# Patient Record
(demographics unavailable — no encounter records)

---

## 2024-10-10 NOTE — HISTORY OF PRESENT ILLNESS
[FreeTextEntry1] : Patient was recently diagnosed with hypothyroidism. On Synthroid 112/day  which is an increase.  She had twin girls.and are doing well. There was no gestational DM. She is having difficulty losing weight. She is now on OCP so menses are light. Symptoms of palpitations resolved.  She has not been here for over a year and does admit to skipping doses of her thyroid medicine. Lost weight on Trulicity but stopped because felt weak and cold; also stopped metformin.  She says her menses are regular.

## 2024-10-10 NOTE — ASSESSMENT
[Carbohydrate Consistent Diet] : carbohydrate consistent diet [Importance of Diet and Exercise] : importance of diet and exercise to improve glycemic control, achieve weight loss and improve cardiovascular health [Levothyroxine] : The patient was instructed to take Levothyroxine on an empty stomach, separate from vitamins, and wait at least 30 minutes before eating [FreeTextEntry1] : Patient with hypothyroidism,  on Synthroid 112 which is an increase.  Hesitant to draw blood today since she is admits to skipping doses of her medicine will be compliant and check labs in 4 weeks.. Thyroid ultrasound showed some new nodules and increase in old nodules in a diffusely heterogenous gland.  Will repeat once thyroid levels are stable.  Has appointment for labs in November and will follow-up with me in 3 months.  Synthroid 112 was renewed.

## 2024-11-01 NOTE — HISTORY OF PRESENT ILLNESS
[Improved Health] : Improved health [Improved Mobility] : Improved mobility [Young Adult] : yound adult [Exercise: ____] : exercise: [unfilled] [Prescription Medications: _____] : prescription medications: [unfilled] [Poor eating habits] : poor eating habits [8] : 8 [I usually sleep 6-8 hours] : I usually sleep 6-8 hours [2 blocks] : Walking distance capability:2 blocks [0] : 0 [None] : none [FreeTextEntry2] : 190 [FreeTextEntry3] : 250 [] : No [FreeTextEntry1] : 27 year old female with hypothyroidism, IR, ONEIL and anxiety presents for evaluation of weight gain. Reports weight gain over the last 1-2 years. She was able to lose weight after having her twins almost 5 years ago but has regained the weight since. She admits that her diet is not the best but she tries to have small portions. She does not exercise d/t lack of time. She feels fatigued.  She has a h/o not being compliant with taking her thyroid medication. She states she just forgets. Most recently has made an effort to be consistent even if she takes it later in the day, Recent labs were reviewed and discussed.   24 HR INTAKE: B: coffee (creamer) Bagel BEC or skips or pancakes L: chicken, rice and beans or [employer]  D; protein rice and beans or pasta salad with protein Snacks: chips, cookies, bread drink: water, juice

## 2024-11-01 NOTE — REVIEW OF SYSTEMS
[Negative] : Allergic/Immunologic [MED-ROS-Cons-FT] : fatigue [MED-ROS-Resp-FT] : CHASE [MED-ROS-Ching-FT] : B/L LEGS PAIN [MED-ROS-Psych-FT] : ANXIETY

## 2024-11-01 NOTE — PHYSICAL EXAM
[TextEntry] : General: Awake, alert, non- diaphoretic. In no acute distress. Well nourished. Head: Normocephalic Skin: No obvious bruises or rashes on hands and face. There are no typical cushingoid features. Eyes: No swelling, lid lag or orbitopathy. No conjunctival injection Thyroid: No obvious goiter Respiratory: no increased respiratory effort. Able to speak in clear sentences. Neurological: Speech normal rate. Psychiatric: normal affect. Cooperative Musculoskeletal: UE- free range of motion Extremities: No tremor.

## 2024-11-01 NOTE — HISTORY OF PRESENT ILLNESS
[Improved Health] : Improved health [Improved Mobility] : Improved mobility [Young Adult] : yound adult [Exercise: ____] : exercise: [unfilled] [Prescription Medications: _____] : prescription medications: [unfilled] [Poor eating habits] : poor eating habits [8] : 8 [I usually sleep 6-8 hours] : I usually sleep 6-8 hours [2 blocks] : Walking distance capability:2 blocks [0] : 0 [None] : none [FreeTextEntry2] : 190 [FreeTextEntry3] : 250 [] : No [FreeTextEntry1] : 27 year old female with hypothyroidism, IR, ONELI and anxiety presents for evaluation of weight gain. Reports weight gain over the last 1-2 years. She was able to lose weight after having her twins almost 5 years ago but has regained the weight since. She admits that her diet is not the best but she tries to have small portions. She does not exercise d/t lack of time. She feels fatigued.  She has a h/o not being compliant with taking her thyroid medication. She states she just forgets. Most recently has made an effort to be consistent even if she takes it later in the day, Recent labs were reviewed and discussed.   24 HR INTAKE: B: coffee (creamer) Bagel BEC or skips or pancakes L: chicken, rice and beans or [employer]  D; protein rice and beans or pasta salad with protein Snacks: chips, cookies, bread drink: water, juice

## 2024-11-01 NOTE — ASSESSMENT
[FreeTextEntry1] : Patient with multiple obesity related comorbidities. We discussed the importance of weight loss in the management of her comorbidities. We discussed the risks of continued excess weight on long term health.  We discussed at length the importance of following a carbohydrate balanced diet and the importance of incorporating protein in meals. We also discussed appropriate alternative food choices. I have recommended she f/u with the RD for additional education. We discussed ways she can incorporate exercise into her daily routine. At least 15 minutes was spent during the visit on obesity counseling.  Spent time explaining how her lifestyle choices are impacting her glucose, insulin and weight. Pending labs can consider AOM. Would prefer GLP1 given IR and her previous success on Trulicity.   Hypothyroidism - stressed importance of taking medication daily and impact of untreated hypothyroidism on weight and overall health  Emotional support provided. All of her questions were answered.  Patient to complete labs, will call with the results.   f/u pending tx plan

## 2025-01-23 NOTE — HISTORY OF PRESENT ILLNESS
[FreeTextEntry1] : 27 year old female with hypothyroidism, IR, ONEIL and anxiety presents for evaluation of weight gain. Reports weight gain over the last 1-2 years. She was able to lose weight after having her twins almost 5 years ago but has regained the weight since. She admits that her diet is not the best but she tries to have small portions. She does not exercise d/t lack of time. She feels fatigued.  She has a h/o not being compliant with taking her thyroid medication. She states she just forgets. Most recently has made an effort to be consistent even if she takes it later in the day, Recent labs were reviewed and discussed.   1/23/25 Since last visit, she met with the RD. Has not really implemented any changes as of yet. She did start to track her calories using an seferino yesterday. Reports 1888 calories for the day. She is considering purchasing a walking pad to use at home. She has been consistent with taking her thyroid medication since meeting with Dr. Madrid. Will go for repeat labs. Weight is stable. Patient is concerned that her weight is consistently increasing.

## 2025-01-23 NOTE — ASSESSMENT
[FreeTextEntry1] : Patient with multiple obesity related comorbidities. We discussed the importance of weight loss in the management of her comorbidities. We discussed the risks of continued excess weight on long term health. Can continue to use the seferino but should focus on recommendations made by RD. have recommended she f/u with the RD for additional education. We discussed ways she can incorporate exercise into her daily routine. At least 15 minutes was spent during the visit on obesity counseling.  Again stressed importance of lifestyle modification. Explained that she will not see a positive changes until she begins making changes. She will call ins co to check for AOM coverage.  Would prefer GLP1 given IR and her previous success on Trulicity.   Hypothyroidism - stressed importance of taking medication daily and impact of untreated hypothyroidism on weight and overall health  Emotional support provided. All of her questions were answered.  f/u pending tx plan

## 2025-06-26 NOTE — HISTORY OF PRESENT ILLNESS
[FreeTextEntry1] : Patient was recently diagnosed with hypothyroidism. On Synthroid 125/day  which is an increase.  She had twin girls.and are doing well. There was no gestational DM. She is having difficulty losing weight. She is now on OCP so menses are light. Symptoms of palpitations resolved.  She has not been here for over a year and does admit to skipping doses of her thyroid medicine. Lost weight on Trulicity but stopped because felt weak and cold; also stopped metformin.  She says her menses are regular. She admits to taking her thyroid medicine erratically tends to take it every other day.  She did meet with Sherry for weight loss but did not follow-up.

## 2025-06-26 NOTE — ASSESSMENT
[Carbohydrate Consistent Diet] : carbohydrate consistent diet [Importance of Diet and Exercise] : importance of diet and exercise to improve glycemic control, achieve weight loss and improve cardiovascular health [Levothyroxine] : The patient was instructed to take Levothyroxine on an empty stomach, separate from vitamins, and wait at least 30 minutes before eating [FreeTextEntry1] : Patient with hypothyroidism, on Synthroid 125 which was an increase.  She admits to taking her medication erratically.  Will check levels today and she will follow-up in 2 months.  I told her to get a daily pill reminder if she misses it  one day she could double up the next day she understands so we will try to be more compliant.  At the end of the week, she should finish up how many pills she has left of levothyroxine.